# Patient Record
Sex: FEMALE | Race: BLACK OR AFRICAN AMERICAN | NOT HISPANIC OR LATINO | Employment: FULL TIME | URBAN - METROPOLITAN AREA
[De-identification: names, ages, dates, MRNs, and addresses within clinical notes are randomized per-mention and may not be internally consistent; named-entity substitution may affect disease eponyms.]

---

## 2023-05-17 ENCOUNTER — TELEPHONE (OUTPATIENT)
Dept: PRIMARY CARE | Facility: CLINIC | Age: 38
End: 2023-05-17
Payer: COMMERCIAL

## 2023-06-01 ENCOUNTER — TELEMEDICINE (OUTPATIENT)
Dept: PRIMARY CARE | Facility: CLINIC | Age: 38
End: 2023-06-01
Payer: COMMERCIAL

## 2023-06-01 DIAGNOSIS — R06.83 SNORING: ICD-10-CM

## 2023-06-01 DIAGNOSIS — R40.0 HAS DAYTIME DROWSINESS: ICD-10-CM

## 2023-06-01 DIAGNOSIS — M79.89 SWELLING OF BOTH HANDS: ICD-10-CM

## 2023-06-01 DIAGNOSIS — N28.9 ABNORMAL RENAL FUNCTION: ICD-10-CM

## 2023-06-01 DIAGNOSIS — Z86.711 HX PULMONARY EMBOLISM: ICD-10-CM

## 2023-06-01 DIAGNOSIS — E04.1 NODULAR THYROID DISEASE: Primary | ICD-10-CM

## 2023-06-01 PROCEDURE — 99214 OFFICE O/P EST MOD 30 MIN: CPT | Performed by: NURSE PRACTITIONER

## 2023-06-01 NOTE — PROGRESS NOTES
Subjective   Patient ID: Alla Kim is a 38 y.o. female who presents for Foot Swelling.    HPI   The patient has had some swelling in the feet   Sometimes her hands swell as well   She does not feel achy  She is tired and exhausted at times   She does not take motrin   She takes her eliquis   She has some headaches and take her blood pressure it is usually 120/80  She had lost weight in 2021 and this year she gained 215 lbs   She has had a dvt and pe in 2015 and has been on anticoagulation since ,   I will order labs for her   She has seen the sleep medicine dept in the past but did not get a sleep study , she probably has sleep apnea     Review of Systems    Objective   There were no vitals taken for this visit.    Physical Exam    Assessment/Plan   Problem List Items Addressed This Visit    None  Visit Diagnoses       Nodular thyroid disease    -  Primary    Relevant Orders    Tsh With Reflex To Free T4 If Abnormal (Completed)    Snoring        Relevant Orders    In-Center Sleep Study (Non-Sleep Provider Only)    Has daytime drowsiness        Relevant Orders    In-Center Sleep Study (Non-Sleep Provider Only)    Abnormal renal function        Relevant Orders    Comprehensive Metabolic Panel (Completed)    Albumin , Urine Random (Completed)    US renal complete    Swelling of both hands        Relevant Orders    Arthritis Panel (CMS) (Completed)    Hx pulmonary embolism

## 2023-07-03 ENCOUNTER — LAB (OUTPATIENT)
Dept: LAB | Facility: LAB | Age: 38
End: 2023-07-03
Payer: COMMERCIAL

## 2023-07-03 DIAGNOSIS — E04.1 NODULAR THYROID DISEASE: ICD-10-CM

## 2023-07-03 DIAGNOSIS — N28.9 ABNORMAL RENAL FUNCTION: ICD-10-CM

## 2023-07-03 DIAGNOSIS — M79.89 SWELLING OF BOTH HANDS: ICD-10-CM

## 2023-07-03 LAB
ALANINE AMINOTRANSFERASE (SGPT) (U/L) IN SER/PLAS: 30 U/L (ref 7–45)
ALBUMIN (G/DL) IN SER/PLAS: 4.1 G/DL (ref 3.4–5)
ALBUMIN (MG/L) IN URINE: <7 MG/L
ALBUMIN/CREATININE (UG/MG) IN URINE: NORMAL UG/MG CRT (ref 0–30)
ALKALINE PHOSPHATASE (U/L) IN SER/PLAS: 67 U/L (ref 33–110)
ANION GAP IN SER/PLAS: 12 MMOL/L (ref 10–20)
ASPARTATE AMINOTRANSFERASE (SGOT) (U/L) IN SER/PLAS: 19 U/L (ref 9–39)
BILIRUBIN TOTAL (MG/DL) IN SER/PLAS: 1.9 MG/DL (ref 0–1.2)
CALCIUM (MG/DL) IN SER/PLAS: 9.7 MG/DL (ref 8.6–10.6)
CARBON DIOXIDE, TOTAL (MMOL/L) IN SER/PLAS: 25 MMOL/L (ref 21–32)
CHLORIDE (MMOL/L) IN SER/PLAS: 107 MMOL/L (ref 98–107)
CREATININE (MG/DL) IN SER/PLAS: 0.97 MG/DL (ref 0.5–1.05)
CREATININE (MG/DL) IN URINE: 260 MG/DL (ref 20–320)
GFR FEMALE: 77 ML/MIN/1.73M2
GLUCOSE (MG/DL) IN SER/PLAS: 99 MG/DL (ref 74–99)
POTASSIUM (MMOL/L) IN SER/PLAS: 3.8 MMOL/L (ref 3.5–5.3)
PROTEIN TOTAL: 7 G/DL (ref 6.4–8.2)
RHEUMATOID FACTOR (IU/ML) IN SERUM OR PLASMA: <10 IU/ML (ref 0–15)
SEDIMENTATION RATE, ERYTHROCYTE: 6 MM/H (ref 0–20)
SODIUM (MMOL/L) IN SER/PLAS: 140 MMOL/L (ref 136–145)
THYROTROPIN (MIU/L) IN SER/PLAS BY DETECTION LIMIT <= 0.05 MIU/L: 0.47 MIU/L (ref 0.44–3.98)
URATE (MG/DL) IN SER/PLAS: 4.3 MG/DL (ref 2.3–6.7)
UREA NITROGEN (MG/DL) IN SER/PLAS: 8 MG/DL (ref 6–23)

## 2023-07-03 PROCEDURE — 86431 RHEUMATOID FACTOR QUANT: CPT

## 2023-07-03 PROCEDURE — 86038 ANTINUCLEAR ANTIBODIES: CPT

## 2023-07-03 PROCEDURE — 85652 RBC SED RATE AUTOMATED: CPT

## 2023-07-03 PROCEDURE — 36415 COLL VENOUS BLD VENIPUNCTURE: CPT

## 2023-07-03 PROCEDURE — 82043 UR ALBUMIN QUANTITATIVE: CPT

## 2023-07-03 PROCEDURE — 82570 ASSAY OF URINE CREATININE: CPT

## 2023-07-03 PROCEDURE — 80053 COMPREHEN METABOLIC PANEL: CPT

## 2023-07-03 PROCEDURE — 84443 ASSAY THYROID STIM HORMONE: CPT

## 2023-07-03 PROCEDURE — 84550 ASSAY OF BLOOD/URIC ACID: CPT

## 2023-07-05 LAB — ANTI-NUCLEAR ANTIBODY (ANA): NEGATIVE

## 2023-09-06 ENCOUNTER — HOSPITAL ENCOUNTER (OUTPATIENT)
Dept: DATA CONVERSION | Facility: HOSPITAL | Age: 38
End: 2023-09-06
Attending: SURGERY | Admitting: SURGERY
Payer: COMMERCIAL

## 2023-09-06 DIAGNOSIS — D24.1 BENIGN NEOPLASM OF RIGHT BREAST: ICD-10-CM

## 2023-09-06 DIAGNOSIS — N64.52 NIPPLE DISCHARGE: ICD-10-CM

## 2023-09-10 DIAGNOSIS — R42 DIZZINESS: Primary | ICD-10-CM

## 2023-09-10 DIAGNOSIS — E55.9 VITAMIN D DEFICIENCY, UNSPECIFIED: ICD-10-CM

## 2023-09-10 DIAGNOSIS — I26.99 OTHER PULMONARY EMBOLISM WITHOUT ACUTE COR PULMONALE (MULTI): ICD-10-CM

## 2023-09-13 LAB
COMPLETE PATHOLOGY REPORT: NORMAL
CONVERTED CLINICAL DIAGNOSIS-HISTORY: NORMAL
CONVERTED FINAL DIAGNOSIS: NORMAL
CONVERTED FINAL REPORT PDF LINK TO COPY AND PASTE: NORMAL
CONVERTED GROSS DESCRIPTION: NORMAL

## 2023-09-14 RX ORDER — APIXABAN 5 MG/1
5 TABLET, FILM COATED ORAL EVERY 12 HOURS
Qty: 60 TABLET | Refills: 5 | Status: SHIPPED | OUTPATIENT
Start: 2023-09-14

## 2023-09-14 RX ORDER — MECLIZINE HYDROCHLORIDE 25 MG/1
25 TABLET ORAL 2 TIMES DAILY
Qty: 30 TABLET | Refills: 2 | Status: SHIPPED | OUTPATIENT
Start: 2023-09-14

## 2023-09-14 RX ORDER — ERGOCALCIFEROL 1.25 MG/1
CAPSULE ORAL
Qty: 6 CAPSULE | Refills: 2 | Status: SHIPPED | OUTPATIENT
Start: 2023-09-14

## 2023-09-29 VITALS — BODY MASS INDEX: 33.15 KG/M2 | WEIGHT: 218.7 LBS | HEIGHT: 68 IN

## 2023-09-30 NOTE — H&P
History & Physical Reviewed:   Pregnant/Lactating:  ·  Are You Pregnant no   ·  Are You Currently Breastfeeding no     I have reviewed the History and Physical dated:  17-Aug-2023   History and Physical reviewed and relevant findings noted. Patient examined to review pertinent physical  findings.: No significant changes   Home Medications Reviewed: no changes noted   Allergies Reviewed: no changes noted       ERAS (Enhanced Recovery After Surgery):  ·  ERAS Patient: no     Consent:   COVID-19 Consent:  ·  COVID-19 Risk Consent Surgeon has reviewed key risks related to the risk of benjamin COVID-19 and if they contract COVID-19 what the risks are.       Electronic Signatures:  Jermaine Redman)  (Signed 06-Sep-2023 09:05)   Authored: History & Physical Reviewed, ERAS, Consent,  Note Completion      Last Updated: 06-Sep-2023 09:05 by Jermaine Redman)

## 2023-10-01 NOTE — OP NOTE
PROCEDURE DETAILS    Preoperative Diagnosis:  Nipple discharge, N64.52  Papilloma right breast    Postoperative Diagnosis:  Nipple discharge, N64.52  Papilloma right breast    Surgeon: Jermaine Redman  Resident/Fellow/Other Assistant: Leno Villalobos    Procedure:  1. Right Breast Ductal Incision  2. Wire Localization of papillary lesion  3. Complex closure 5cm    Anesthesia: Shreyas Schwab  Estimated Blood Loss: 5cc  Findings: area of concern in specimen  Specimens(s) Collected: yes,  papillry lesion right breast   Patient Returned To/Condition: To RR in sat cond        Operative Report:   Clinical note: This woman is having some bloody nipple discharge and on ultrasound was found to have a papillary lesion which on core biopsy did not have any  atypia.  The discharge stopped for a while but restarted and was bloody.  She had a repeat ultrasound which did not show any change in the lesion and no other lesions.  She is here for excision of the papillary lesion after wire localization and a ductal  excision.    Operative note:  With the patient in the supine position after adequate general anesthesia was obtained the wire was cut closer to the skin in the breast chest neck  abdomen were prepped with ChloraPrep she was draped in usual manner.  We made a periareolar incision took this down through the subcutaneous tissue into the breast tissue we elevated the tissue under the nipple and divided the tissue just under the base  of the nipple.  There was some discharge from the duct.  It was brownish.  We then elevated skin flaps superiorly and laterally and identified the wire under the skin and pulled it through and then went around the wire widely at the section where the  the lesion was localized.  We did go through the duct from this area which again has some discharge.  We marked the specimen for orientation inked it and sent it to mammography which confirmed the lesion was present.  We then infiltrated the area with   local anesthetic irrigated with saline there was good hemostasis the lesion was relatively superficial laterally so there was a defect in the breast.  We elevated tissue in the deep portion towards the nipple and away from the nipple and then brought  that together with interrupted 3-0 Vicryl sutures we then further elevated tissue more superficially and brought that from the nipple outwards with 3-0 Vicryl again. We then created a third layer which I was able to bring up under the thinnest part of  the skin.  We then reapproximated the skin with interrupted 3-0 Vicryl suture                        Attestation:   Note Completion:  Attending Attestation I performed the procedure without a resident         Electronic Signatures:  Jermaine Redman)  (Signed 06-Sep-2023 15:15)   Authored: Post-Operative Note, Chart Review, Note Completion      Last Updated: 06-Sep-2023 15:15 by Jermaine Redman)

## 2024-03-08 DIAGNOSIS — K62.5 RECTAL BLEEDING: Primary | ICD-10-CM

## 2024-03-12 ENCOUNTER — OFFICE VISIT (OUTPATIENT)
Dept: SURGERY | Facility: CLINIC | Age: 39
End: 2024-03-12
Payer: COMMERCIAL

## 2024-03-12 VITALS
HEIGHT: 68 IN | TEMPERATURE: 97.6 F | BODY MASS INDEX: 32.52 KG/M2 | HEART RATE: 75 BPM | SYSTOLIC BLOOD PRESSURE: 104 MMHG | DIASTOLIC BLOOD PRESSURE: 64 MMHG | WEIGHT: 214.6 LBS

## 2024-03-12 DIAGNOSIS — K62.5 RECTAL BLEEDING: ICD-10-CM

## 2024-03-12 DIAGNOSIS — K59.09 CHRONIC CONSTIPATION: Primary | ICD-10-CM

## 2024-03-12 PROCEDURE — 1036F TOBACCO NON-USER: CPT | Performed by: NURSE PRACTITIONER

## 2024-03-12 PROCEDURE — 46600 DIAGNOSTIC ANOSCOPY SPX: CPT | Performed by: NURSE PRACTITIONER

## 2024-03-12 PROCEDURE — 99213 OFFICE O/P EST LOW 20 MIN: CPT | Performed by: NURSE PRACTITIONER

## 2024-03-12 PROCEDURE — 99203 OFFICE O/P NEW LOW 30 MIN: CPT | Performed by: NURSE PRACTITIONER

## 2024-03-12 RX ORDER — SPIRONOLACTONE 100 MG/1
TABLET, FILM COATED ORAL
COMMUNITY
Start: 2021-06-24

## 2024-03-12 RX ORDER — HYDROCORTISONE 25 MG/G
CREAM TOPICAL
COMMUNITY
Start: 2021-02-09

## 2024-03-12 RX ORDER — HYDROXYZINE HYDROCHLORIDE 25 MG/1
TABLET, FILM COATED ORAL
COMMUNITY
Start: 2021-01-06

## 2024-03-12 RX ORDER — OMEPRAZOLE 20 MG/1
CAPSULE, DELAYED RELEASE ORAL
COMMUNITY
Start: 2015-06-17

## 2024-03-12 RX ORDER — BENZOYL PEROXIDE 50 MG/ML
LIQUID TOPICAL
COMMUNITY
Start: 2014-07-29

## 2024-03-12 RX ORDER — DESIPRAMINE HYDROCHLORIDE 10 MG/1
TABLET ORAL
COMMUNITY
Start: 2021-08-17

## 2024-03-12 RX ORDER — RADIOPAQUE PVC MARKERS/BARIUM 24MARKERS
1 CAPSULE ORAL ONCE
Qty: 1 CAPSULE | Refills: 0 | Status: SHIPPED | OUTPATIENT
Start: 2024-03-12 | End: 2024-03-12

## 2024-03-12 RX ORDER — DOXYCYCLINE HYCLATE 100 MG
TABLET ORAL EVERY 12 HOURS
COMMUNITY

## 2024-03-12 RX ORDER — ASPIRIN 81 MG/1
TABLET ORAL
COMMUNITY
Start: 2016-04-13

## 2024-03-12 RX ORDER — LORAZEPAM 0.5 MG/1
TABLET ORAL 3 TIMES DAILY PRN
COMMUNITY

## 2024-03-12 RX ORDER — CETIRIZINE HYDROCHLORIDE 10 MG/1
1 TABLET ORAL NIGHTLY
COMMUNITY
Start: 2020-02-21

## 2024-03-12 RX ORDER — VALACYCLOVIR HYDROCHLORIDE 500 MG/1
TABLET, FILM COATED ORAL 2 TIMES DAILY
COMMUNITY
Start: 2023-11-09

## 2024-03-12 RX ORDER — FERROUS SULFATE 325(65) MG
325 TABLET ORAL DAILY
COMMUNITY
Start: 2020-06-04

## 2024-03-12 RX ORDER — FLUTICASONE PROPIONATE 50 MCG
SPRAY, SUSPENSION (ML) NASAL
COMMUNITY
Start: 2020-02-21

## 2024-03-12 RX ORDER — FLUOXETINE HYDROCHLORIDE 20 MG/1
CAPSULE ORAL
COMMUNITY
Start: 2016-10-18

## 2024-03-12 RX ORDER — ONDANSETRON 4 MG/1
1 TABLET, ORALLY DISINTEGRATING ORAL EVERY 8 HOURS
COMMUNITY
Start: 2019-01-14 | End: 2024-03-25

## 2024-03-12 RX ORDER — IBUPROFEN 600 MG/1
TABLET ORAL
COMMUNITY
Start: 2018-05-01

## 2024-03-12 ASSESSMENT — PAIN SCALES - GENERAL: PAINLEVEL: 0-NO PAIN

## 2024-03-12 ASSESSMENT — ENCOUNTER SYMPTOMS: HEMATOCHEZIA: 1

## 2024-03-12 NOTE — PROGRESS NOTES
Subjective   Patient ID: Alla Pelayo is a 38 y.o. female who is on Eliquis for a DVT and has chronic constipation who presents today with rectal bleeding.    Rectal Bleeding      2015 DVT right leg and a PE and has been on Eliquis since then.    She has chronic constipation and has to self disimpact at times.  Last week she had a lot of bleeding after a BM for about 3 days. She feels the stool in the rectum but she cannot get the stool out.  She will have a BM every 2 weeks.  She has tried different OTC medications but they did not help.  She will sit long on the toilet to have a BM.      No c/o any weight loss or abdominal pain.      Colonoscopy 8-10 years ago negative.    She has had 2 vaginal births with no tears.  No hx of any perianal surgeries.      Review of Systems   Gastrointestinal:  Positive for hematochezia.   All other systems reviewed and are negative.      Objective   Physical Exam    Anoscopy    Date/Time: 3/12/2024 9:00 AM    Performed by: RADHA Forrester  Authorized by: RADHA Forrester    Consent:     Consent obtained:  Verbal    Consent given by:  Patient    Risks, benefits, and alternatives were discussed: yes    Universal protocol:     Procedure explained and questions answered to patient or proxy's satisfaction: yes      Patient identity confirmed:  Verbally with patient  Post-procedure details:     Procedure completion:  Tolerated  Comments:      No external hemorrhoids.  Good tone on MARINO.  On anoscopy, mild irritation of the internal hemorrhoids.  No active bleeding. Discomfort on exam.    Assessment/Plan   Alla has chronic constipation.  She will start taking Linzess daily to see if that will help her have better BM's.  She will schedule to have a sitz marker study to rule out colonic inertia.  We also talked about her getting another colonoscopy in the future.  She will call me in a week to see if the medication is working.  I will call her with the results of the  scans when done.      Lainey Sharif, MARIA DE JESUS-CNP 03/12/24 8:03 AM

## 2024-03-22 DIAGNOSIS — R11.0 NAUSEA: Primary | ICD-10-CM

## 2024-03-25 RX ORDER — ONDANSETRON 4 MG/1
4 TABLET, ORALLY DISINTEGRATING ORAL EVERY 8 HOURS
Qty: 30 TABLET | Refills: 0 | Status: SHIPPED | OUTPATIENT
Start: 2024-03-25

## 2024-03-27 ENCOUNTER — APPOINTMENT (OUTPATIENT)
Dept: PRIMARY CARE | Facility: CLINIC | Age: 39
End: 2024-03-27
Payer: COMMERCIAL

## 2024-05-15 ENCOUNTER — APPOINTMENT (OUTPATIENT)
Dept: PRIMARY CARE | Facility: CLINIC | Age: 39
End: 2024-05-15
Payer: COMMERCIAL

## 2024-06-13 ENCOUNTER — APPOINTMENT (OUTPATIENT)
Dept: PRIMARY CARE | Facility: CLINIC | Age: 39
End: 2024-06-13
Payer: COMMERCIAL

## 2024-07-12 ENCOUNTER — APPOINTMENT (OUTPATIENT)
Dept: PRIMARY CARE | Facility: CLINIC | Age: 39
End: 2024-07-12
Payer: COMMERCIAL

## 2024-07-12 VITALS
SYSTOLIC BLOOD PRESSURE: 102 MMHG | WEIGHT: 219.9 LBS | HEART RATE: 72 BPM | HEIGHT: 68 IN | DIASTOLIC BLOOD PRESSURE: 60 MMHG | TEMPERATURE: 97.8 F | OXYGEN SATURATION: 99 % | BODY MASS INDEX: 33.33 KG/M2 | RESPIRATION RATE: 12 BRPM

## 2024-07-12 DIAGNOSIS — G47.9 DIFFICULTY SLEEPING: ICD-10-CM

## 2024-07-12 DIAGNOSIS — M54.41 CHRONIC BILATERAL LOW BACK PAIN WITH BILATERAL SCIATICA: ICD-10-CM

## 2024-07-12 DIAGNOSIS — R63.5 WEIGHT GAIN: ICD-10-CM

## 2024-07-12 DIAGNOSIS — N64.4 BREAST PAIN, RIGHT: ICD-10-CM

## 2024-07-12 DIAGNOSIS — Z13.1 DIABETES MELLITUS SCREENING: ICD-10-CM

## 2024-07-12 DIAGNOSIS — Z13.220 LIPID SCREENING: ICD-10-CM

## 2024-07-12 DIAGNOSIS — Z12.31 ENCOUNTER FOR SCREENING MAMMOGRAM FOR MALIGNANT NEOPLASM OF BREAST: ICD-10-CM

## 2024-07-12 DIAGNOSIS — E55.9 VITAMIN D DEFICIENCY, UNSPECIFIED: ICD-10-CM

## 2024-07-12 DIAGNOSIS — R42 DIZZINESS: ICD-10-CM

## 2024-07-12 DIAGNOSIS — Z00.00 ENCOUNTER FOR ROUTINE HISTORY AND PHYSICAL EXAMINATION OF ADULT: Primary | ICD-10-CM

## 2024-07-12 DIAGNOSIS — M54.42 CHRONIC BILATERAL LOW BACK PAIN WITH BILATERAL SCIATICA: ICD-10-CM

## 2024-07-12 DIAGNOSIS — G89.29 CHRONIC BILATERAL LOW BACK PAIN WITH BILATERAL SCIATICA: ICD-10-CM

## 2024-07-12 DIAGNOSIS — R11.0 NAUSEA: ICD-10-CM

## 2024-07-12 DIAGNOSIS — Z79.01 ON CONTINUOUS ORAL ANTICOAGULATION: ICD-10-CM

## 2024-07-12 DIAGNOSIS — D64.9 ANEMIA, UNSPECIFIED TYPE: ICD-10-CM

## 2024-07-12 DIAGNOSIS — N64.4 BREAST PAIN, LEFT: ICD-10-CM

## 2024-07-12 PROCEDURE — 1036F TOBACCO NON-USER: CPT | Performed by: NURSE PRACTITIONER

## 2024-07-12 PROCEDURE — 99395 PREV VISIT EST AGE 18-39: CPT | Performed by: NURSE PRACTITIONER

## 2024-07-12 PROCEDURE — 3008F BODY MASS INDEX DOCD: CPT | Performed by: NURSE PRACTITIONER

## 2024-07-12 PROCEDURE — 99214 OFFICE O/P EST MOD 30 MIN: CPT | Performed by: NURSE PRACTITIONER

## 2024-07-12 RX ORDER — OMEPRAZOLE 20 MG/1
20 CAPSULE, DELAYED RELEASE ORAL
Qty: 180 CAPSULE | Refills: 0 | Status: SHIPPED | OUTPATIENT
Start: 2024-07-12

## 2024-07-12 RX ORDER — ONDANSETRON 4 MG/1
4 TABLET, ORALLY DISINTEGRATING ORAL EVERY 8 HOURS
Qty: 30 TABLET | Refills: 0 | Status: SHIPPED | OUTPATIENT
Start: 2024-07-12

## 2024-07-12 RX ORDER — ERGOCALCIFEROL 1.25 MG/1
50000 CAPSULE ORAL
Qty: 6 CAPSULE | Refills: 2 | Status: SHIPPED | OUTPATIENT
Start: 2024-07-12

## 2024-07-12 RX ORDER — DESIPRAMINE HYDROCHLORIDE 10 MG/1
TABLET ORAL
Status: CANCELLED | OUTPATIENT
Start: 2024-07-12

## 2024-07-12 RX ORDER — MECLIZINE HYDROCHLORIDE 25 MG/1
25 TABLET ORAL 2 TIMES DAILY
Qty: 30 TABLET | Refills: 2 | Status: SHIPPED | OUTPATIENT
Start: 2024-07-12

## 2024-07-12 RX ORDER — GABAPENTIN 100 MG/1
CAPSULE ORAL
Qty: 90 CAPSULE | Refills: 5 | Status: SHIPPED | OUTPATIENT
Start: 2024-07-12

## 2024-07-12 RX ORDER — HYDROXYZINE HYDROCHLORIDE 25 MG/1
25 TABLET, FILM COATED ORAL NIGHTLY
Qty: 90 TABLET | Refills: 0 | Status: SHIPPED | OUTPATIENT
Start: 2024-07-12

## 2024-07-12 ASSESSMENT — PATIENT HEALTH QUESTIONNAIRE - PHQ9
SUM OF ALL RESPONSES TO PHQ9 QUESTIONS 1 AND 2: 0
2. FEELING DOWN, DEPRESSED OR HOPELESS: NOT AT ALL
1. LITTLE INTEREST OR PLEASURE IN DOING THINGS: NOT AT ALL

## 2024-07-12 NOTE — PATIENT INSTRUCTIONS
Duloxetene can be used for pain   Gabapentin can be used for pain   The desipramine has so many side effects and can cause glaucoma   Try to eat twice a day and do protein drinks and skip bread and sweets   Do what you did before   The breast center is in the new wing [facility] the [facility] on the first floor  Come back fasting   Follow up 3 months

## 2024-07-12 NOTE — PROGRESS NOTES
"Subjective   Patient ID: Alla Pelayo is a 39 y.o. female who presents for Annual Exam (Physical. Patient would like to discuss weight).    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  She has gained weight in  the last year   She lost weight for her wedding   She had stopped meat and dairy and ate seafood   She eats once a day  She works Monday to Friday  and works 8 to 2   She gets low back pain   She had a clot in the left leg in the past  Bile duct cancer gm   Mf prostate throat cancer   Mother breast cancer   Mgm breast cancer   Htn heart disease heart failure   Her paternal aunt   Her father had chf   She has heavy menses   She stops taking her blood thinner around her menses due to excessive bleeding     Review of Systems Negative except what was mentioned in the HPI       Objective   /60 (Patient Position: Sitting)   Pulse 72   Temp 36.6 °C (97.8 °F)   Resp 12   Ht 1.727 m (5' 8\")   Wt 99.7 kg (219 lb 14.4 oz)   SpO2 99%   BMI 33.44 kg/m²     Physical Exam  Vitals and nursing note reviewed.   Constitutional:       Appearance: Normal appearance.   HENT:      Head: Normocephalic and atraumatic.      Right Ear: Tympanic membrane normal.      Left Ear: Tympanic membrane normal.      Nose: Nose normal.      Mouth/Throat:      Mouth: Mucous membranes are moist.   Eyes:      Extraocular Movements: Extraocular movements intact.      Conjunctiva/sclera: Conjunctivae normal.   Cardiovascular:      Rate and Rhythm: Normal rate and regular rhythm.      Pulses: Normal pulses.      Heart sounds: Normal heart sounds.   Pulmonary:      Effort: Pulmonary effort is normal.      Breath sounds: Normal breath sounds.   Abdominal:      General: Abdomen is flat. Bowel sounds are normal.      Palpations: Abdomen is soft.   Musculoskeletal:         " General: Normal range of motion.      Cervical back: Normal range of motion and neck supple.      Comments: Painful rom of the lower back   Skin:     General: Skin is warm and dry.   Neurological:      General: No focal deficit present.      Mental Status: She is alert and oriented to person, place, and time. Mental status is at baseline.   Psychiatric:         Mood and Affect: Mood normal.         Behavior: Behavior normal.         Thought Content: Thought content normal.         Judgment: Judgment normal.         Assessment/Plan   Problem List Items Addressed This Visit    None  Visit Diagnoses         Codes    Encounter for routine history and physical examination of adult    -  Primary Z00.00    Vitamin D deficiency, unspecified     E55.9    Relevant Medications    ergocalciferol (Vitamin D-2) 1.25 MG (98004 UT) capsule    Dizziness     R42    Relevant Medications    meclizine (Antivert) 25 mg tablet    Other Relevant Orders    Referral to Neurology    Nausea     R11.0    Relevant Medications    omeprazole (PriLOSEC) 20 mg DR capsule    ondansetron ODT (Zofran-ODT) 4 mg disintegrating tablet    Difficulty sleeping     G47.9    Relevant Medications    hydrOXYzine HCL (Atarax) 25 mg tablet    Chronic bilateral low back pain with bilateral sciatica     M54.42, M54.41, G89.29    Relevant Medications    gabapentin (Neurontin) 100 mg capsule    Anemia, unspecified type     D64.9    Relevant Orders    CBC    Diabetes mellitus screening     Z13.1    Relevant Orders    Comprehensive Metabolic Panel    Weight gain     R63.5    Relevant Orders    Tsh With Reflex To Free T4 If Abnormal    Lipid screening     Z13.220    Relevant Orders    Lipid panel    On continuous oral anticoagulation     Z79.01    Encounter for screening mammogram for malignant neoplasm of breast     Z12.31    Relevant Orders    BI mammo bilateral screening tomosynthesis    BI mammo bilateral diagnostic tomosynthesis    BI US breast complete bilateral     Breast pain, right     N64.4    Relevant Orders    BI mammo bilateral diagnostic tomosynthesis    BI US breast complete bilateral    Breast pain, left     N64.4    Relevant Orders    BI mammo bilateral diagnostic tomosynthesis    BI US breast complete bilateral

## 2024-08-13 ENCOUNTER — LAB (OUTPATIENT)
Dept: LAB | Facility: LAB | Age: 39
End: 2024-08-13
Payer: COMMERCIAL

## 2024-08-14 ENCOUNTER — LAB (OUTPATIENT)
Dept: LAB | Facility: LAB | Age: 39
End: 2024-08-14
Payer: COMMERCIAL

## 2024-08-14 DIAGNOSIS — D64.9 ANEMIA, UNSPECIFIED TYPE: ICD-10-CM

## 2024-08-14 DIAGNOSIS — R63.5 WEIGHT GAIN: ICD-10-CM

## 2024-08-14 DIAGNOSIS — Z13.1 DIABETES MELLITUS SCREENING: ICD-10-CM

## 2024-08-14 DIAGNOSIS — Z13.220 LIPID SCREENING: ICD-10-CM

## 2024-08-14 LAB
ALBUMIN SERPL BCP-MCNC: 4.2 G/DL (ref 3.4–5)
ALP SERPL-CCNC: 63 U/L (ref 33–110)
ALT SERPL W P-5'-P-CCNC: 19 U/L (ref 7–45)
ANION GAP SERPL CALC-SCNC: 12 MMOL/L (ref 10–20)
AST SERPL W P-5'-P-CCNC: 21 U/L (ref 9–39)
BILIRUB SERPL-MCNC: 1 MG/DL (ref 0–1.2)
BUN SERPL-MCNC: 10 MG/DL (ref 6–23)
CALCIUM SERPL-MCNC: 9.2 MG/DL (ref 8.6–10.6)
CHLORIDE SERPL-SCNC: 107 MMOL/L (ref 98–107)
CHOLEST SERPL-MCNC: 197 MG/DL (ref 0–199)
CHOLESTEROL/HDL RATIO: 3
CO2 SERPL-SCNC: 28 MMOL/L (ref 21–32)
CREAT SERPL-MCNC: 1.04 MG/DL (ref 0.5–1.05)
EGFRCR SERPLBLD CKD-EPI 2021: 70 ML/MIN/1.73M*2
ERYTHROCYTE [DISTWIDTH] IN BLOOD BY AUTOMATED COUNT: 16.5 % (ref 11.5–14.5)
GLUCOSE SERPL-MCNC: 90 MG/DL (ref 74–99)
HCT VFR BLD AUTO: 34.4 % (ref 36–46)
HDLC SERPL-MCNC: 65.9 MG/DL
HGB BLD-MCNC: 10.5 G/DL (ref 12–16)
LDLC SERPL CALC-MCNC: 114 MG/DL
MCH RBC QN AUTO: 23.3 PG (ref 26–34)
MCHC RBC AUTO-ENTMCNC: 30.5 G/DL (ref 32–36)
MCV RBC AUTO: 76 FL (ref 80–100)
NON HDL CHOLESTEROL: 131 MG/DL (ref 0–149)
NRBC BLD-RTO: 0 /100 WBCS (ref 0–0)
PLATELET # BLD AUTO: 278 X10*3/UL (ref 150–450)
POTASSIUM SERPL-SCNC: 4.5 MMOL/L (ref 3.5–5.3)
PROT SERPL-MCNC: 7.1 G/DL (ref 6.4–8.2)
RBC # BLD AUTO: 4.51 X10*6/UL (ref 4–5.2)
SODIUM SERPL-SCNC: 142 MMOL/L (ref 136–145)
TRIGL SERPL-MCNC: 87 MG/DL (ref 0–149)
TSH SERPL-ACNC: 1.13 MIU/L (ref 0.44–3.98)
VLDL: 17 MG/DL (ref 0–40)
WBC # BLD AUTO: 3.6 X10*3/UL (ref 4.4–11.3)

## 2024-08-14 PROCEDURE — 80061 LIPID PANEL: CPT

## 2024-08-14 PROCEDURE — 85027 COMPLETE CBC AUTOMATED: CPT

## 2024-08-14 PROCEDURE — 80053 COMPREHEN METABOLIC PANEL: CPT

## 2024-08-14 PROCEDURE — 84443 ASSAY THYROID STIM HORMONE: CPT

## 2024-08-14 PROCEDURE — 36415 COLL VENOUS BLD VENIPUNCTURE: CPT

## 2024-08-21 ENCOUNTER — HOSPITAL ENCOUNTER (OUTPATIENT)
Dept: RADIOLOGY | Facility: CLINIC | Age: 39
Discharge: HOME | End: 2024-08-21
Payer: COMMERCIAL

## 2024-08-21 VITALS — HEIGHT: 68 IN | BODY MASS INDEX: 33.31 KG/M2 | WEIGHT: 219.8 LBS

## 2024-08-21 DIAGNOSIS — N64.4 BREAST PAIN, LEFT: ICD-10-CM

## 2024-08-21 DIAGNOSIS — Z12.31 ENCOUNTER FOR SCREENING MAMMOGRAM FOR MALIGNANT NEOPLASM OF BREAST: ICD-10-CM

## 2024-08-21 DIAGNOSIS — N64.4 BREAST PAIN, RIGHT: ICD-10-CM

## 2024-08-21 PROCEDURE — 77066 DX MAMMO INCL CAD BI: CPT | Performed by: RADIOLOGY

## 2024-08-21 PROCEDURE — 77062 BREAST TOMOSYNTHESIS BI: CPT | Performed by: RADIOLOGY

## 2024-08-21 PROCEDURE — 76642 ULTRASOUND BREAST LIMITED: CPT | Mod: 50

## 2024-08-21 PROCEDURE — 76642 ULTRASOUND BREAST LIMITED: CPT | Performed by: RADIOLOGY

## 2024-08-21 PROCEDURE — 77062 BREAST TOMOSYNTHESIS BI: CPT

## 2024-09-20 DIAGNOSIS — K59.03 DRUG-INDUCED CONSTIPATION: ICD-10-CM

## 2024-09-20 DIAGNOSIS — D64.9 ANEMIA, UNSPECIFIED TYPE: Primary | ICD-10-CM

## 2024-09-20 RX ORDER — FERROUS SULFATE 325(65) MG
325 TABLET ORAL DAILY
Qty: 100 TABLET | Refills: 2 | Status: SHIPPED | OUTPATIENT
Start: 2024-09-20

## 2024-09-20 RX ORDER — DOCUSATE SODIUM 100 MG/1
CAPSULE, LIQUID FILLED ORAL
Qty: 60 CAPSULE | Refills: 11 | Status: SHIPPED | OUTPATIENT
Start: 2024-09-20

## 2024-10-07 DIAGNOSIS — R11.0 NAUSEA: ICD-10-CM

## 2024-10-07 DIAGNOSIS — G47.9 DIFFICULTY SLEEPING: ICD-10-CM

## 2024-10-08 RX ORDER — OMEPRAZOLE 20 MG/1
20 CAPSULE, DELAYED RELEASE ORAL
Qty: 180 CAPSULE | Refills: 0 | Status: SHIPPED | OUTPATIENT
Start: 2024-10-08

## 2024-10-08 RX ORDER — HYDROXYZINE HYDROCHLORIDE 25 MG/1
25 TABLET, FILM COATED ORAL NIGHTLY
Qty: 90 TABLET | Refills: 1 | Status: SHIPPED | OUTPATIENT
Start: 2024-10-08

## 2024-10-11 ENCOUNTER — APPOINTMENT (OUTPATIENT)
Dept: PRIMARY CARE | Facility: CLINIC | Age: 39
End: 2024-10-11
Payer: COMMERCIAL

## 2024-10-11 DIAGNOSIS — M54.50 ACUTE BILATERAL LOW BACK PAIN WITHOUT SCIATICA: ICD-10-CM

## 2024-10-11 DIAGNOSIS — K59.09 CHRONIC CONSTIPATION: Primary | ICD-10-CM

## 2024-10-11 DIAGNOSIS — Z79.01 ON CONTINUOUS ORAL ANTICOAGULATION: ICD-10-CM

## 2024-10-11 DIAGNOSIS — N92.0 MENORRHAGIA WITH REGULAR CYCLE: ICD-10-CM

## 2024-10-11 DIAGNOSIS — I26.99 OTHER PULMONARY EMBOLISM WITHOUT ACUTE COR PULMONALE: ICD-10-CM

## 2024-10-11 DIAGNOSIS — R11.0 NAUSEA: ICD-10-CM

## 2024-10-11 PROCEDURE — 99214 OFFICE O/P EST MOD 30 MIN: CPT | Performed by: NURSE PRACTITIONER

## 2024-10-11 RX ORDER — CYCLOBENZAPRINE HCL 10 MG
10 TABLET ORAL NIGHTLY PRN
Qty: 30 TABLET | Refills: 3 | Status: SHIPPED | OUTPATIENT
Start: 2024-10-11

## 2024-10-11 RX ORDER — POLYETHYLENE GLYCOL 3350 17 G/17G
17 POWDER, FOR SOLUTION ORAL DAILY
Qty: 90 PACKET | Refills: 2 | Status: SHIPPED | OUTPATIENT
Start: 2024-10-11 | End: 2025-10-11

## 2024-10-11 RX ORDER — ONDANSETRON 8 MG/1
8 TABLET, ORALLY DISINTEGRATING ORAL EVERY 8 HOURS PRN
Qty: 60 TABLET | Refills: 11 | Status: SHIPPED | OUTPATIENT
Start: 2024-10-11

## 2024-10-11 NOTE — PROGRESS NOTES
Subjective   Patient ID: Alla Pelayo is a 39 y.o. female who presents for Back Pain and Constipation.    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    Physical Exam  HENT:      Head: Normocephalic.      Nose: Nose normal.      Mouth/Throat:      Mouth: Mucous membranes are dry.   Pulmonary:      Effort: Pulmonary effort is normal.   Neurological:      Mental Status: She is alert and oriented to person, place, and time.   Psychiatric:         Mood and Affect: Mood normal.         Behavior: Behavior normal.         Thought Content: Thought content normal.         Assessment/Plan   Problem List Items Addressed This Visit             ICD-10-CM    Chronic constipation - Primary K59.09    Relevant Medications    polyethylene glycol (Glycolax, Miralax) 17 gram packet    Other Relevant Orders    Referral to Gastroenterology     Other Visit Diagnoses         Codes    Acute bilateral low back pain without sciatica     M54.50    Relevant Medications    cyclobenzaprine (Flexeril) 10 mg tablet    Other Relevant Orders    Referral to Physical Therapy    Nausea     R11.0    Relevant Medications    ondansetron ODT (Zofran-ODT) 8 mg disintegrating tablet    Menorrhagia with regular cycle     N92.0    On continuous oral anticoagulation     Z79.01    Other pulmonary embolism without acute cor pulmonale     I26.99    Relevant Medications    apixaban (Eliquis)  5 mg tablet

## 2025-01-10 DIAGNOSIS — R11.0 NAUSEA: ICD-10-CM

## 2025-01-10 RX ORDER — OMEPRAZOLE 20 MG/1
20 CAPSULE, DELAYED RELEASE ORAL
Qty: 180 CAPSULE | Refills: 0 | Status: SHIPPED | OUTPATIENT
Start: 2025-01-10

## 2025-02-07 ENCOUNTER — APPOINTMENT (OUTPATIENT)
Dept: NEUROLOGY | Facility: HOSPITAL | Age: 40
End: 2025-02-07
Payer: COMMERCIAL

## 2025-02-20 ENCOUNTER — APPOINTMENT (OUTPATIENT)
Dept: PRIMARY CARE | Facility: CLINIC | Age: 40
End: 2025-02-20
Payer: COMMERCIAL

## 2025-02-20 VITALS
HEART RATE: 64 BPM | BODY MASS INDEX: 30.87 KG/M2 | OXYGEN SATURATION: 98 % | WEIGHT: 203 LBS | DIASTOLIC BLOOD PRESSURE: 71 MMHG | SYSTOLIC BLOOD PRESSURE: 122 MMHG

## 2025-02-20 DIAGNOSIS — N28.9 ABNORMAL RENAL FUNCTION: ICD-10-CM

## 2025-02-20 DIAGNOSIS — R73.01 IFG (IMPAIRED FASTING GLUCOSE): ICD-10-CM

## 2025-02-20 DIAGNOSIS — B00.9 HERPES: ICD-10-CM

## 2025-02-20 DIAGNOSIS — G89.29 CHRONIC RIGHT-SIDED LOW BACK PAIN WITHOUT SCIATICA: ICD-10-CM

## 2025-02-20 DIAGNOSIS — D68.69 OTHER THROMBOPHILIA: ICD-10-CM

## 2025-02-20 DIAGNOSIS — Z79.01 ANTICOAGULATED: Primary | ICD-10-CM

## 2025-02-20 DIAGNOSIS — M54.50 CHRONIC RIGHT-SIDED LOW BACK PAIN WITHOUT SCIATICA: ICD-10-CM

## 2025-02-20 DIAGNOSIS — D50.9 IRON DEFICIENCY ANEMIA, UNSPECIFIED IRON DEFICIENCY ANEMIA TYPE: ICD-10-CM

## 2025-02-20 DIAGNOSIS — E78.5 HYPERLIPIDEMIA, UNSPECIFIED HYPERLIPIDEMIA TYPE: ICD-10-CM

## 2025-02-20 DIAGNOSIS — Z86.711 HX PULMONARY EMBOLISM: ICD-10-CM

## 2025-02-20 PROCEDURE — 1036F TOBACCO NON-USER: CPT | Performed by: INTERNAL MEDICINE

## 2025-02-20 PROCEDURE — 99214 OFFICE O/P EST MOD 30 MIN: CPT | Performed by: INTERNAL MEDICINE

## 2025-02-20 RX ORDER — PREDNISONE 10 MG/1
TABLET ORAL
Qty: 30 TABLET | Refills: 0 | Status: SHIPPED | OUTPATIENT
Start: 2025-02-20

## 2025-02-20 RX ORDER — VALACYCLOVIR HYDROCHLORIDE 500 MG/1
500 TABLET, FILM COATED ORAL 2 TIMES DAILY
Qty: 60 TABLET | Refills: 1 | Status: SHIPPED | OUTPATIENT
Start: 2025-02-20

## 2025-02-20 ASSESSMENT — PATIENT HEALTH QUESTIONNAIRE - PHQ9
2. FEELING DOWN, DEPRESSED OR HOPELESS: NOT AT ALL
1. LITTLE INTEREST OR PLEASURE IN DOING THINGS: NOT AT ALL
SUM OF ALL RESPONSES TO PHQ9 QUESTIONS 1 AND 2: 0

## 2025-02-20 NOTE — PROGRESS NOTES
Subjective   Patient ID: Alla Pelayo is a 39 y.o. female who presents for Establish Care (New patient here to establish care).    HPI   The patient is 39 year old woman with PMH significant for PE, chronically anticoagulated, who presents to establish medical care.  C/O low back pain without radiation, more on the right side, 3 months duration. Gabapentin and Flexeril were not effective.    Review of Systems  Low back pain  Objective   /71   Pulse 64   Wt 92.1 kg (203 lb)   SpO2 98%   BMI 30.87 kg/m²     Physical Exam  NAD. Cooperative.  Lungs CTA  Heart: RRR  Musculoskeletal system: WNL  Neurologic exam: WNL    Assessment/Plan   Diagnoses and all orders for this visit:  Anticoagulated  Iron deficiency anemia, unspecified iron deficiency anemia type  -     Transferrin; Future  -     Iron and TIBC; Future  -     Ferritin; Future  -     CBC; Future  Hyperlipidemia, unspecified hyperlipidemia type  -     TSH; Future  -     Lipid panel; Future  Other thrombophilia  -     CBC; Future  Hx pulmonary embolism  Abnormal renal function  -     Comprehensive metabolic panel; Future  Herpes  -     valACYclovir (Valtrex) 500 mg tablet; Take 1 tablet (500 mg) by mouth 2 times a day.  IFG (impaired fasting glucose)  -     Hemoglobin A1C; Future  Chronic right-sided low back pain without sciatica  -     Referral to Physical Therapy; Future  -     predniSONE (Deltasone) 10 mg tablet; Take 4 tablets daily for 3 days Take 3 tablets daily for 3 days Take 2 tablets daily for 3 days Take 1 tablet daily for 3 days  Reviewed medical records from 6/20 to 8/24, discussed with the patient.  Discussed health benefits from whole food plant based diet. Bibliotherapy: The [place] Study, ALEX Rao, PhD, suggested  HPV vaccine series was recommended.

## 2025-02-21 LAB
ALBUMIN SERPL-MCNC: 4.7 G/DL (ref 3.6–5.1)
ALP SERPL-CCNC: 54 U/L (ref 31–125)
ALT SERPL-CCNC: 15 U/L (ref 6–29)
ANION GAP SERPL CALCULATED.4IONS-SCNC: 10 MMOL/L (CALC) (ref 7–17)
AST SERPL-CCNC: 14 U/L (ref 10–30)
BILIRUB SERPL-MCNC: 1.5 MG/DL (ref 0.2–1.2)
BUN SERPL-MCNC: 10 MG/DL (ref 7–25)
CALCIUM SERPL-MCNC: 9.4 MG/DL (ref 8.6–10.2)
CHLORIDE SERPL-SCNC: 106 MMOL/L (ref 98–110)
CHOLEST SERPL-MCNC: 191 MG/DL
CHOLEST/HDLC SERPL: 3.1 (CALC)
CO2 SERPL-SCNC: 23 MMOL/L (ref 20–32)
CREAT SERPL-MCNC: 1.03 MG/DL (ref 0.5–0.97)
EGFRCR SERPLBLD CKD-EPI 2021: 71 ML/MIN/1.73M2
ERYTHROCYTE [DISTWIDTH] IN BLOOD BY AUTOMATED COUNT: 12.9 % (ref 11–15)
EST. AVERAGE GLUCOSE BLD GHB EST-MCNC: 97 MG/DL
EST. AVERAGE GLUCOSE BLD GHB EST-SCNC: 5.4 MMOL/L
FERRITIN SERPL-MCNC: 22 NG/ML (ref 16–154)
GLUCOSE SERPL-MCNC: 85 MG/DL (ref 65–99)
HBA1C MFR BLD: 5 % OF TOTAL HGB
HCT VFR BLD AUTO: 43.4 % (ref 35–45)
HDLC SERPL-MCNC: 61 MG/DL
HGB BLD-MCNC: 13.8 G/DL (ref 11.7–15.5)
IRON SATN MFR SERPL: 16 % (CALC) (ref 16–45)
IRON SERPL-MCNC: 63 MCG/DL (ref 40–190)
LDLC SERPL CALC-MCNC: 115 MG/DL (CALC)
MCH RBC QN AUTO: 27.7 PG (ref 27–33)
MCHC RBC AUTO-ENTMCNC: 31.8 G/DL (ref 32–36)
MCV RBC AUTO: 87.1 FL (ref 80–100)
NONHDLC SERPL-MCNC: 130 MG/DL (CALC)
PLATELET # BLD AUTO: 268 THOUSAND/UL (ref 140–400)
PMV BLD REES-ECKER: 9.7 FL (ref 7.5–12.5)
POTASSIUM SERPL-SCNC: 4.6 MMOL/L (ref 3.5–5.3)
PROT SERPL-MCNC: 7.5 G/DL (ref 6.1–8.1)
RBC # BLD AUTO: 4.98 MILLION/UL (ref 3.8–5.1)
SODIUM SERPL-SCNC: 139 MMOL/L (ref 135–146)
TIBC SERPL-MCNC: 392 MCG/DL (CALC) (ref 250–450)
TRANSFERRIN SERPL-MCNC: 332 MG/DL (ref 188–341)
TRIGL SERPL-MCNC: 61 MG/DL
TSH SERPL-ACNC: 1.01 MIU/L
WBC # BLD AUTO: 3.9 THOUSAND/UL (ref 3.8–10.8)

## 2025-04-30 ENCOUNTER — TELEPHONE (OUTPATIENT)
Dept: PRIMARY CARE | Facility: CLINIC | Age: 40
End: 2025-04-30
Payer: COMMERCIAL

## 2025-05-08 ENCOUNTER — APPOINTMENT (OUTPATIENT)
Dept: PRIMARY CARE | Facility: CLINIC | Age: 40
End: 2025-05-08

## 2025-05-08 DIAGNOSIS — Z79.01 CHRONIC ANTICOAGULATION: ICD-10-CM

## 2025-05-08 DIAGNOSIS — D68.69 OTHER THROMBOPHILIA: Primary | ICD-10-CM

## 2025-05-08 PROCEDURE — 99213 OFFICE O/P EST LOW 20 MIN: CPT | Performed by: INTERNAL MEDICINE

## 2025-05-08 PROCEDURE — 1036F TOBACCO NON-USER: CPT | Performed by: INTERNAL MEDICINE

## 2025-05-08 NOTE — PROGRESS NOTES
Subjective   Patient ID: Alla Pelayo is a 39 y.o. female who presents for Follow-up (Video visit for follow-up).  The patient is in the state of [place] during the virtual visit.    HPI   The patient presents for a discussion of anticoagulation in light of the anticipated pregnancy. On Eliquis, inquiring about the anticoagulation safe in pregnancy.  Under the care of OB/GYN the patient used Lovenox and Heparin injections during the last pregnancy.    Assessment/Plan   Diagnoses and all orders for this visit:  Other thrombophilia  Chronic anticoagulation  Discussed the treatment plan at the onset of pregnancy. The Eliquis will be replaced with Lovenox subcutaneous injections. The anticoagulation could be managed by the patient OB/GYN team.

## 2025-06-16 ENCOUNTER — TELEPHONE (OUTPATIENT)
Dept: PRIMARY CARE | Facility: CLINIC | Age: 40
End: 2025-06-16
Payer: COMMERCIAL

## 2025-06-16 DIAGNOSIS — Z86.711 HX PULMONARY EMBOLISM: Primary | ICD-10-CM

## 2025-06-16 DIAGNOSIS — D68.69 OTHER THROMBOPHILIA: ICD-10-CM

## 2025-06-17 RX ORDER — ENOXAPARIN SODIUM 100 MG/ML
40 INJECTION SUBCUTANEOUS DAILY
Qty: 30 EACH | Refills: 2 | Status: SHIPPED | OUTPATIENT
Start: 2025-06-17 | End: 2025-09-15

## 2025-07-03 ENCOUNTER — OFFICE VISIT (OUTPATIENT)
Dept: CARDIOLOGY | Facility: CLINIC | Age: 40
End: 2025-07-03
Payer: COMMERCIAL

## 2025-07-03 VITALS
BODY MASS INDEX: 32.7 KG/M2 | HEART RATE: 86 BPM | SYSTOLIC BLOOD PRESSURE: 124 MMHG | WEIGHT: 215 LBS | OXYGEN SATURATION: 98 % | DIASTOLIC BLOOD PRESSURE: 70 MMHG

## 2025-07-03 DIAGNOSIS — Z86.711 HX PULMONARY EMBOLISM: Primary | ICD-10-CM

## 2025-07-03 PROCEDURE — 99203 OFFICE O/P NEW LOW 30 MIN: CPT | Performed by: INTERNAL MEDICINE

## 2025-07-03 PROCEDURE — 99202 OFFICE O/P NEW SF 15 MIN: CPT

## 2025-07-03 NOTE — PROGRESS NOTES
Vascular Medicine new patient visit    Referred by Bonnie Garrison MD      History of Present Illness:  Alla Pelayo is a/an 40 y.o. woman with history of pulmonary embolism in 2015 on long-term anticoagulation    Unprovoked pulmonary embolism in 2015    No fam history of venous thromboembolism   Mom had breast cancer  Maternal gm had cholangiocarcinoma  MGF with prostate cancer; throat cancer    Medical History[1]  Surgical History[2]  Social History[3]  Family History[4]  Current Medications[5]    Physical Examination:  Blood pressure 124/70, pulse 86, weight 97.5 kg (215 lb), SpO2 98%.  ***      Pertinent Labs:    Pertinent Imaging:    There are no diagnoses linked to this encounter.    Anel Langford MD, MS           [1]   Past Medical History:  Diagnosis Date    Personal history of other specified conditions 10/18/2016    History of abdominal pain    Personal history of other specified conditions     History of dysuria    Personal history of pulmonary embolism 01/04/2018    History of pulmonary embolism   [2]   Past Surgical History:  Procedure Laterality Date    BREAST BIOPSY      OTHER SURGICAL HISTORY  08/09/2022    Breast biopsy core needle   [3]   Social History  Tobacco Use    Smoking status: Never    Smokeless tobacco: Never   Substance Use Topics    Alcohol use: Yes    Drug use: Never   [4] No family history on file.  [5]   Current Outpatient Medications   Medication Sig Dispense Refill    docusate sodium (Colace) 100 mg capsule TAKE 2 CAPSULES DAILY FOR CONSTIPATION 60 capsule 11    enoxaparin (Lovenox) 40 mg/0.4 mL syringe Inject 0.4 mL (40 mg) under the skin once daily. 30 each 2    ferrous sulfate, 325 mg ferrous sulfate, tablet Take 1 tablet (325 mg) by mouth once daily. 100 tablet 2    fluticasone (Flonase) 50 mcg/actuation nasal spray Administer into affected nostril(s).      meclizine (Antivert) 25 mg tablet Take 1 tablet (25 mg) by mouth 2 times a day. 30 tablet 2    omeprazole  (PriLOSEC) 20 mg DR capsule TAKE 1 CAPSULE BY MOUTH TWICE A DAY BEFORE MEALS 180 capsule 0    ondansetron ODT (Zofran-ODT) 8 mg disintegrating tablet Take 1 tablet (8 mg) by mouth every 8 hours if needed for nausea or vomiting. 60 tablet 11    polyethylene glycol (Glycolax, Miralax) 17 gram packet Take 17 g by mouth once daily. Mix 1 cap (17g) into 8 ounces of fluid. 90 packet 2    valACYclovir (Valtrex) 500 mg tablet Take 1 tablet (500 mg) by mouth 2 times a day. 60 tablet 1    FLUoxetine (PROzac) 20 mg capsule Take by mouth. (Patient not taking: Reported on 7/3/2025)      predniSONE (Deltasone) 10 mg tablet Take 4 tablets daily for 3 days Take 3 tablets daily for 3 days Take 2 tablets daily for 3 days Take 1 tablet daily for 3 days (Patient not taking: Reported on 7/3/2025) 30 tablet 0    spironolactone (Aldactone) 100 mg tablet Take by mouth. (Patient not taking: Reported on 7/3/2025)       No current facility-administered medications for this visit.      into affected nostril(s).      meclizine (Antivert) 25 mg tablet Take 1 tablet (25 mg) by mouth 2 times a day. 30 tablet 2    omeprazole (PriLOSEC) 20 mg DR capsule TAKE 1 CAPSULE BY MOUTH TWICE A DAY BEFORE MEALS 180 capsule 0    ondansetron ODT (Zofran-ODT) 8 mg disintegrating tablet Take 1 tablet (8 mg) by mouth every 8 hours if needed for nausea or vomiting. 60 tablet 11    polyethylene glycol (Glycolax, Miralax) 17 gram packet Take 17 g by mouth once daily. Mix 1 cap (17g) into 8 ounces of fluid. 90 packet 2    valACYclovir (Valtrex) 500 mg tablet Take 1 tablet (500 mg) by mouth 2 times a day. 60 tablet 1    FLUoxetine (PROzac) 20 mg capsule Take by mouth. (Patient not taking: Reported on 7/3/2025)      predniSONE (Deltasone) 10 mg tablet Take 4 tablets daily for 3 days Take 3 tablets daily for 3 days Take 2 tablets daily for 3 days Take 1 tablet daily for 3 days (Patient not taking: Reported on 7/3/2025) 30 tablet 0    spironolactone (Aldactone) 100 mg tablet Take by mouth. (Patient not taking: Reported on 7/3/2025)       No current facility-administered medications for this visit.

## 2025-08-14 ENCOUNTER — APPOINTMENT (OUTPATIENT)
Dept: CARDIOLOGY | Facility: CLINIC | Age: 40
End: 2025-08-14
Payer: COMMERCIAL

## 2025-08-21 ENCOUNTER — APPOINTMENT (OUTPATIENT)
Dept: PRIMARY CARE | Facility: CLINIC | Age: 40
End: 2025-08-21
Payer: COMMERCIAL

## 2025-08-21 VITALS
BODY MASS INDEX: 35.31 KG/M2 | WEIGHT: 225 LBS | HEIGHT: 67 IN | DIASTOLIC BLOOD PRESSURE: 70 MMHG | SYSTOLIC BLOOD PRESSURE: 110 MMHG | HEART RATE: 78 BPM | OXYGEN SATURATION: 96 %

## 2025-08-21 DIAGNOSIS — Z00.00 ANNUAL PHYSICAL EXAM: Primary | ICD-10-CM

## 2025-08-21 DIAGNOSIS — Z86.711 HX PULMONARY EMBOLISM: ICD-10-CM

## 2025-08-21 PROCEDURE — 3008F BODY MASS INDEX DOCD: CPT | Performed by: INTERNAL MEDICINE

## 2025-08-21 PROCEDURE — 99396 PREV VISIT EST AGE 40-64: CPT | Performed by: INTERNAL MEDICINE

## 2025-08-21 PROCEDURE — 1036F TOBACCO NON-USER: CPT | Performed by: INTERNAL MEDICINE

## 2025-08-21 RX ORDER — ENOXAPARIN SODIUM 100 MG/ML
40 INJECTION SUBCUTANEOUS DAILY
Qty: 30 EACH | Refills: 3 | Status: SHIPPED | OUTPATIENT
Start: 2025-08-21
